# Patient Record
Sex: MALE | Race: WHITE | NOT HISPANIC OR LATINO
[De-identification: names, ages, dates, MRNs, and addresses within clinical notes are randomized per-mention and may not be internally consistent; named-entity substitution may affect disease eponyms.]

---

## 2018-04-18 PROBLEM — Z00.00 ENCOUNTER FOR PREVENTIVE HEALTH EXAMINATION: Status: ACTIVE | Noted: 2018-04-18

## 2021-09-30 ENCOUNTER — APPOINTMENT (OUTPATIENT)
Dept: SURGERY | Facility: CLINIC | Age: 31
End: 2021-09-30
Payer: COMMERCIAL

## 2021-09-30 VITALS
WEIGHT: 195 LBS | TEMPERATURE: 96.5 F | SYSTOLIC BLOOD PRESSURE: 129 MMHG | BODY MASS INDEX: 26.41 KG/M2 | HEART RATE: 88 BPM | HEIGHT: 72 IN | OXYGEN SATURATION: 94 % | DIASTOLIC BLOOD PRESSURE: 77 MMHG

## 2021-09-30 DIAGNOSIS — Z78.9 OTHER SPECIFIED HEALTH STATUS: ICD-10-CM

## 2021-09-30 DIAGNOSIS — Z82.49 FAMILY HISTORY OF ISCHEMIC HEART DISEASE AND OTHER DISEASES OF THE CIRCULATORY SYSTEM: ICD-10-CM

## 2021-09-30 DIAGNOSIS — T78.2XXA ANAPHYLACTIC SHOCK, UNSPECIFIED, INITIAL ENCOUNTER: ICD-10-CM

## 2021-09-30 PROCEDURE — 10081 I&D PILONIDAL CYST COMP: CPT | Mod: 59

## 2021-09-30 PROCEDURE — 99202 OFFICE O/P NEW SF 15 MIN: CPT | Mod: 25

## 2021-09-30 RX ORDER — AMOXICILLIN AND CLAVULANATE POTASSIUM 875; 125 MG/1; MG/1
875-125 TABLET, COATED ORAL
Qty: 14 | Refills: 0 | Status: ACTIVE | COMMUNITY
Start: 2021-09-30 | End: 1900-01-01

## 2021-09-30 RX ORDER — OXYCODONE AND ACETAMINOPHEN 5; 325 MG/1; MG/1
5-325 TABLET ORAL
Qty: 8 | Refills: 0 | Status: ACTIVE | COMMUNITY
Start: 2021-09-30 | End: 1900-01-01

## 2021-09-30 NOTE — PHYSICAL EXAM
[JVD] : no jugular venous distention  [Normal Thyroid] : the thyroid was normal [Carotid Bruits] : no carotid bruits [Normal Breath Sounds] : Normal breath sounds [Normal Heart Sounds] : normal heart sounds [Normal Rate and Rhythm] : normal rate and rhythm [Skin Induration] : induration [Alert] : alert [Oriented to Person] : oriented to person [Oriented to Place] : oriented to place [Oriented to Time] : oriented to time [Calm] : calm [de-identified] : well developed white male in distress secondary to pain [de-identified] : noninjected and nonicteric [de-identified] : without adenopathy [de-identified] : benign [de-identified] : without hernia [de-identified] : pilonidal abscess [de-identified] : without calf pain or swelling

## 2021-09-30 NOTE — HISTORY OF PRESENT ILLNESS
[de-identified] : pilonidal abscess [de-identified] : 31 year old white male who has had other episodes of pain and swelling in this same area that have resolved on there own who presents one week of pain and swelling in the sacral area. He just recovering from a covid infection so he is not sure if his temps are related. He denies any changes in his bowel habits.

## 2021-09-30 NOTE — REVIEW OF SYSTEMS
[Fever] : fever [Chills] : chills [Feeling Poorly] : feeling poorly [Feeling Tired] : feeling tired [Recent Weight Gain (___ Lbs)] : no recent weight gain [Recent Weight Loss (___ Lbs)] : recent [unfilled] ~Ulb weight loss [Shortness Of Breath] : shortness of breath [Wheezing] : no wheezing [Cough] : cough [SOB on Exertion] : no shortness of breath during exertion [Orthopnea] : no orthopnea [PND] : no PND [Abdominal Pain] : no abdominal pain [Vomiting] : vomiting [Constipation] : no constipation [Diarrhea] : no diarrhea [Heartburn] : no heartburn [Melena] : no melena [Negative] : Heme/Lymph [FreeTextEntry2] : recent covid infection three weeks ago

## 2021-09-30 NOTE — ASSESSMENT
[FreeTextEntry1] : Pt had an incision and drainage of his abscess with approximately 5 cc of pus obtained. It was sent for culture. The wound was irrigated and than packed open

## 2021-10-07 ENCOUNTER — APPOINTMENT (OUTPATIENT)
Dept: SURGERY | Facility: CLINIC | Age: 31
End: 2021-10-07
Payer: COMMERCIAL

## 2021-10-07 ENCOUNTER — NON-APPOINTMENT (OUTPATIENT)
Age: 31
End: 2021-10-07

## 2021-10-07 VITALS — TEMPERATURE: 97.9 F

## 2021-10-07 DIAGNOSIS — L05.01 PILONIDAL CYST WITH ABSCESS: ICD-10-CM

## 2021-10-07 PROCEDURE — 99024 POSTOP FOLLOW-UP VISIT: CPT

## 2021-10-07 NOTE — ASSESSMENT
[FreeTextEntry1] : pt doing well.\par REcommend elective excision of pilonidal cyst, All risks, benefits and options discussed/

## 2021-10-07 NOTE — HISTORY OF PRESENT ILLNESS
[de-identified] : pilonidal abscess s/p incision and drainage [de-identified] : pt improved, denies further drainage, no fevers or chills.

## 2021-10-07 NOTE — PHYSICAL EXAM
[JVD] : no jugular venous distention  [Carotid Bruits] : no carotid bruits [Normal Breath Sounds] : Normal breath sounds [Normal Heart Sounds] : normal heart sounds [Normal Rate and Rhythm] : normal rate and rhythm [Calm] : calm [de-identified] : well developed white male in no distress [de-identified] : noninjected and nonicteric [de-identified] : without adenopathy [de-identified] : benign [de-identified] : pilonidal cyst without infection  [de-identified] : without calf pain or swelling

## 2021-10-11 LAB — BACTERIA WND CULT: ABNORMAL

## 2021-10-22 ENCOUNTER — APPOINTMENT (OUTPATIENT)
Dept: SURGERY | Facility: HOSPITAL | Age: 31
End: 2021-10-22